# Patient Record
Sex: MALE | Race: ASIAN | Employment: FULL TIME | URBAN - METROPOLITAN AREA
[De-identification: names, ages, dates, MRNs, and addresses within clinical notes are randomized per-mention and may not be internally consistent; named-entity substitution may affect disease eponyms.]

---

## 2019-11-30 ENCOUNTER — HOSPITAL ENCOUNTER (EMERGENCY)
Age: 39
Discharge: HOME | End: 2019-11-30
Attending: EMERGENCY MEDICINE
Payer: COMMERCIAL

## 2019-11-30 VITALS
SYSTOLIC BLOOD PRESSURE: 147 MMHG | OXYGEN SATURATION: 100 % | HEART RATE: 72 BPM | RESPIRATION RATE: 16 BRPM | DIASTOLIC BLOOD PRESSURE: 100 MMHG | HEIGHT: 69 IN | WEIGHT: 238.98 LBS | BODY MASS INDEX: 35.4 KG/M2 | TEMPERATURE: 98.8 F

## 2019-11-30 DIAGNOSIS — I10 MILD HYPERTENSION: Primary | ICD-10-CM

## 2019-11-30 PROCEDURE — 99284 EMERGENCY DEPT VISIT MOD MDM: CPT

## 2019-11-30 PROCEDURE — 93005 ELECTROCARDIOGRAM TRACING: CPT

## 2019-11-30 NOTE — ED TRIAGE NOTES
Triage Note: Patient arrives to ER wanting to have his blood pressure checked. Female  states she checked his BP at home 140s/80s. Then went to [facility] to have it checked and it was in the 130s/80s. Manual BP checked here 142/98. Denies having any symptoms.

## 2019-12-01 LAB
ATRIAL RATE: 65 BPM
CALCULATED P AXIS, ECG09: 39 DEGREES
CALCULATED R AXIS, ECG10: 18 DEGREES
CALCULATED T AXIS, ECG11: 22 DEGREES
DIAGNOSIS, 93000: NORMAL
P-R INTERVAL, ECG05: 154 MS
Q-T INTERVAL, ECG07: 380 MS
QRS DURATION, ECG06: 90 MS
QTC CALCULATION (BEZET), ECG08: 395 MS
VENTRICULAR RATE, ECG03: 65 BPM

## 2019-12-01 NOTE — ED PROVIDER NOTES
62-year-old male with no significant past medical history, who reportedly works out regularly doing power lifting without issue, and who last night reportedly was celebrating the holiday with his family and admitted to drinking too much alcohol presents to the emergency department for evaluation of HTN. Reportedly he recently had a physical and at that point was told to keep an eye on his blood pressure as it was borderline elevated. He states that during this time he had basic blood work drawn that all returned reassuringly. He states that he has been monitoring his blood pressure fairly regularly with the majority of his readings being between 486G and 824P systolic. Today he went to the drugstore and checked his blood pressure and it was 140s over 90s. He then presented to the emergency department for further evaluation. The patient denies any associated symptoms with this elevated blood pressure including chest pain, chest pressure, S OB, nausea, vomiting, lightheadedness, palpitations, leg swelling, headache. He does state that he is a bit tired and groggy today due to his mild hangover from drinking last night. He does not currently take anything for his blood pressure. He does note a family history of heart disease. History reviewed. No pertinent past medical history. History reviewed. No pertinent surgical history. History reviewed. No pertinent family history.     Social History     Socioeconomic History    Marital status:      Spouse name: Not on file    Number of children: Not on file    Years of education: Not on file    Highest education level: Not on file   Occupational History    Not on file   Social Needs    Financial resource strain: Not on file    Food insecurity:     Worry: Not on file     Inability: Not on file    Transportation needs:     Medical: Not on file     Non-medical: Not on file   Tobacco Use    Smoking status: Former Smoker    Smokeless tobacco: Never Used   Substance and Sexual Activity    Alcohol use: Yes     Comment: socially     Drug use: Never    Sexual activity: Not on file   Lifestyle    Physical activity:     Days per week: Not on file     Minutes per session: Not on file    Stress: Not on file   Relationships    Social connections:     Talks on phone: Not on file     Gets together: Not on file     Attends Alevism service: Not on file     Active member of club or organization: Not on file     Attends meetings of clubs or organizations: Not on file     Relationship status: Not on file    Intimate partner violence:     Fear of current or ex partner: Not on file     Emotionally abused: Not on file     Physically abused: Not on file     Forced sexual activity: Not on file   Other Topics Concern    Not on file   Social History Narrative    Not on file         ALLERGIES: Patient has no known allergies. Review of Systems   Constitutional: Negative for activity change, appetite change, chills and fever. HENT: Negative for congestion, rhinorrhea, sinus pain, sneezing and sore throat. Eyes: Negative for photophobia and visual disturbance. Respiratory: Negative for cough and shortness of breath. Cardiovascular: Negative for chest pain. Gastrointestinal: Negative for abdominal pain, blood in stool, constipation, diarrhea, nausea and vomiting. Genitourinary: Negative for difficulty urinating, dysuria, flank pain, hematuria, penile pain and testicular pain. Musculoskeletal: Negative for arthralgias, back pain, myalgias and neck pain. Skin: Negative for rash and wound. Neurological: Negative for syncope, weakness, light-headedness, numbness and headaches. Psychiatric/Behavioral: Negative for self-injury and suicidal ideas. All other systems reviewed and are negative.       Vitals:    11/30/19 1749 11/30/19 1800 11/30/19 1823   BP: (!) 142/98 (!) 140/97 (!) 147/100   Pulse: 72     Resp: 16     Temp: 98.8 °F (37.1 °C) SpO2: 100%     Weight: 108.4 kg (238 lb 15.7 oz)     Height: 5' 9\" (1.753 m)              Physical Exam  Vitals signs and nursing note reviewed. Constitutional:       General: He is not in acute distress. Appearance: He is well-developed. He is not diaphoretic. HENT:      Head: Normocephalic and atraumatic. Nose: Nose normal.   Eyes:      Conjunctiva/sclera: Conjunctivae normal.      Pupils: Pupils are equal, round, and reactive to light. Neck:      Musculoskeletal: Neck supple. Cardiovascular:      Rate and Rhythm: Normal rate and regular rhythm. Heart sounds: Normal heart sounds. Pulmonary:      Effort: Pulmonary effort is normal.      Breath sounds: Normal breath sounds. Abdominal:      General: There is no distension. Palpations: Abdomen is soft. Tenderness: There is no tenderness. Musculoskeletal:         General: No tenderness. Skin:     General: Skin is warm and dry. Neurological:      Mental Status: He is alert and oriented to person, place, and time. GCS: GCS eye subscore is 4. GCS verbal subscore is 5. GCS motor subscore is 6. Cranial Nerves: No cranial nerve deficit. Sensory: No sensory deficit. Coordination: Coordination normal.          MDM   27-year-old male presents with concern for HTN. He was recommended he continue with his exercise routine and eat heart healthy diet and try weight loss and of the lifestyle modifications keeping a log of his blood pressure readings. He was recommended to follow-up with his primary care provider for further evaluation of his blood pressure trends over time and to evaluate the efficacy of his lifestyle modifications. Reassurance was given. Return precautions were given for worsening or concerns.     Procedures